# Patient Record
(demographics unavailable — no encounter records)

---

## 2018-02-27 NOTE — DOBUTAMINE ECHO
*NOTICE TO RECEIVING PARTY AGENCY**  This information is strictly Confidential and protected under 
[place] law.  [place] law prohibits you from making any further disclosure of this 
information unless further disclosure is expressly permitted by the written consent of the person to 
whom it pertains or is authorized by law.  A general authorization for the release of medical or 
other information is not sufficient for this purpose.  Hospital accepts no responsibility if the 
information is made available to any other person, INCLUDING THE PATIENT.



Interpretation Summary

  *  Name: VY CAR III  Study Date: 2018 10:09 AM  BP: 110/75 mmHg

  *  MRN: [id number]  Patient Location: [facility]  HR: 60

  *  : 1970 (M/d/yyyy)  Gender: Male  Height: 74 in

  *  Age: 47 yrs  Ethnicity: [place]  Weight: 167 lb

  *  Ordering Physician: Arnaldo Franco

  *  Referring Physician: Arnaldo Franco

  *  Performed By: Hilda Luong RDCS

  *  Accession# [id number]  Account# [id number]

  *  Reason For Study: Chest pain, dyspnea

  *  BSA: 2.0 m2

  *  -- Conclusions --

  *  Left ventricular systolic function is normal.

  *  Normal diastolic function

  *  Right ventricular systolic pressure is normal.

  *  Normal dobutamine echocardiogram without evidence of inducible ischemia

Procedure Details

  *  DOBUTAMINE ECHO, CPT#20110

  *  ECHO DOPPLER, CPT #40884

  *  ECHO COLOR FLOW, CPT #54966

  *  A contrast injection of Definity was performed to improve assessment of LV function.

  *  Contrast was injected into an intravenous site in the left arm.

  *  One vial of Definity ultrasound contrast was diluted in normal saline to a total volume of 10 
ml.  A total of '5' ml of solution was administered during imaging.

  *  Lot # [id number] of Definity utilized for procedure.

  *  Expiration date .

  *  The attending nurse who injected the contrast agent was Jennifer Garcia RN.

Left Ventricular Findings with Stress

  *  Normal dobutamine echocardiogram without evidence of inducible ischemia

Left Ventricle

  *  The left ventricle is normal in size.

  *  There is normal left ventricular wall thickness.

  *  Ejection Fraction = 60-65%.

  *  Left ventricular systolic function is normal.

  *  Normal diastolic function

  *  The left ventricular wall motion is normal at rest.

Right Ventricle

  *  The right ventricle is normal in size and function.

  *  The right ventricular systolic function is normal as assessed by tricuspid annular plane 
systolic excursion (TAPSE) (normal >1.5 cm).

Atria

  *  The left atrial size is normal.

  *  Right atrial size is normal.

Mitral Valve

  *  The mitral valve is grossly normal.

  *  There is no mitral regurgitation noted.

Tricuspid Valve

  *  The tricuspid valve is not well visualized, but is grossly normal.

  *  There is trace tricuspid regurgitation.

  *  Right ventricular systolic pressure is normal.

Aortic Valve

  *  The aortic valve is normal in structure and function.

  *  The aortic valve is trileaflet.

  *  No hemodynamically significant valvular aortic stenosis.

  *  There is no significant aortic regurgitation.

Great Vessels

  *  The aortic root is normal size.

Pericardium

  *  There is no pericardial effusion.

Stress Parameters

  *  Normal baseline electrocardiogram.

  *  Stress ECG: No ST changes.  No arrhythmias.

  *  Rest heart rate was '60' BPM.

  *  Rest blood pressure was '110/75'

  *  Maximum heart rate achieved was 153 bpm.

  *  Maximum heart rate was 88 % of maximum age-predicted heart rate.

  *  Maximum blood pressure was '149/82'

  *  Maximum Dobutamine infusion rate was '40' mcg/kg/min.

  *  A total of 0.5 mg of intravenous Atropine was used to supplement Dobutamine for heart rate 
response.

  *  Dobutamine infusion was terminated due to achieving target heart rate

  *  A total of 5 mg of IV Metoprolol was administered to reverse Dobutamine-induced tachycardia.

  *  The patient did not exhibit any symptoms during drug infusion.

Left Ventricular Findings with Stress

  *  Normal baseline EKG No significant EKG changes with dobutamine infusion Baseline wall motion is 
normal There was normal augmentation of all segments without inducible wall motion abnormality at 
peak dobutamine infusion Normal heart rate and blood pressure response to dobutamine No symptoms 
reported



MMode 2D Measurements and Calculations

IVSd 0.66 cm



LVIDd 4.5 cm

LVIDs 3.1 cm

LVPWd 0.69 cm



IVS/LVPW 0.95 

FS 31.7 %

EDV(Teich) 92.5 ml

ESV(Teich) 37.1 ml

EF(Teich) 59.9 %



EDV(cubed) 91.2 ml

ESV(cubed) 29.0 ml

EF(cubed) 68.2 %





LV mass(C)d 91.6 grams

LV mass(C)dI 45.5 grams/m\S\2



SV(Teich) 55.3 ml

SI(Teich) 27.5 ml/m\S\2

SV(cubed) 62.1 ml

SI(cubed) 30.9 ml/m\S\2



Ao root diam 2.6 cm

Ao root area 5.4 cm\S\2

ACS 2.2 cm

LA dimension 2.4 cm



asc Aorta Diam 3.3 cm





LA/Ao 0.92 

LVOT diam 2.0 cm

LVOT area 3.3 cm\S\2



LVAd ap4 30.8 cm\S\2

LVLd ap4 7.4 cm

EDV(MOD-sp4) 106.2 ml

EDV(sp4-el) 109.6 ml

LVAs ap4 16.5 cm\S\2

LVLs ap4 5.7 cm

ESV(MOD-sp4) 40.5 ml

ESV(sp4-el) 40.6 ml

EF(MOD-sp4) 61.9 %

EF(sp4-el) 63.0 %



LVAd ap2 30.1 cm\S\2

LVLd ap2 7.5 cm

EDV(MOD-sp2) 101.3 ml

EDV(sp2-el) 102.4 ml

LVAs ap2 17.3 cm\S\2

LVLs ap2 6.1 cm

ESV(MOD-sp2) 40.0 ml

ESV(sp2-el) 41.5 ml

EF(MOD-sp2) 60.5 %

EF(sp2-el) 59.5 %



LVLd %diff 1.9 %

EDV(MOD-bp) 105.3 ml

LVLs %diff 6.9 %

ESV(MOD-bp) 42.0 ml

EF(MOD-bp) 60.1 %





SV(MOD-sp4) 65.7 ml

SI(MOD-sp4) 32.6 ml/m\S\2



SV(MOD-sp2) 61.3 ml

SI(MOD-sp2) 30.5 ml/m\S\2



SV(MOD-bp) 63.3 ml

SI(MOD-bp) 31.5 ml/m\S\2



SV(sp4-el) 69.0 ml

SI(sp4-el) 34.3 ml/m\S\2





SV(sp2-el) 60.9 ml

SI(sp2-el) 30.3 ml/m\S\2













Doppler Measurements and Calculations

MV E max mariano 58.2 cm/sec

MV A max mariano 46.6 cm/sec



MV E/A 1.3 



MV dec time 0.30 sec



Ao V2 max 87.6 cm/sec

Ao max PG 3.1 mmHg

Ao max PG (full) 0.44 mmHg

LAVELLE(V,A) 3.0 cm\S\2

LAVELLE(V,D) 3.0 cm\S\2





LV V1 max PG 2.6 mmHg



LV V1 max 81.0 cm/sec



PA V2 max 67.9 cm/sec

PA max PG 1.8 mmHg

PA acc slope 484.5 cm/sec\S\2

PA acc time 0.10 sec



TR max mariano 177.8 cm/sec





PA pr(Accel) 33.1 mmHg

## 2018-03-01 NOTE — CODING QUERY NO DIAGNOSIS
: 1970

TREATMENT RENDERED WITHOUT A DIAGNOSIS                                                  



To promote full compliance with coding requirements relating to patient care, physician 
participation is requested in all cases of  uncertainty.  Please assist us with 
providing a diagnosis/symptom for the test(s) below:



A diagnosis/symptom was not documented on your Order.  A valid diagnosis/symptom is 
required to bill all insurances.



**Please remember that we are unable to code a diagnosis of rule out, probable, possible, 
questionable, or suspected.  



Tests that require a diagnosis:

DOS: 02/15/18

* Beta Hydroxybutyrate      DIAGNOSIS:













Provider Signature: _____________________________ Date: _________



Thank you  

Cristina Galo

Health Information Management

Phone:  [phone]

Fax:  [phone]



Once completed, please kindly fax back to [phone]



For questions please call [phone]